# Patient Record
Sex: FEMALE | Race: OTHER | NOT HISPANIC OR LATINO | URBAN - METROPOLITAN AREA
[De-identification: names, ages, dates, MRNs, and addresses within clinical notes are randomized per-mention and may not be internally consistent; named-entity substitution may affect disease eponyms.]

---

## 2018-07-24 ENCOUNTER — EMERGENCY (EMERGENCY)
Facility: HOSPITAL | Age: 43
LOS: 1 days | Discharge: HOME | End: 2018-07-24
Attending: EMERGENCY MEDICINE | Admitting: EMERGENCY MEDICINE
Payer: COMMERCIAL

## 2018-07-24 VITALS
SYSTOLIC BLOOD PRESSURE: 143 MMHG | HEART RATE: 89 BPM | TEMPERATURE: 98 F | OXYGEN SATURATION: 98 % | RESPIRATION RATE: 18 BRPM | DIASTOLIC BLOOD PRESSURE: 85 MMHG
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
## 2018-07-24 DIAGNOSIS — S80.211A ABRASION, RIGHT KNEE, INITIAL ENCOUNTER: ICD-10-CM

## 2018-07-24 DIAGNOSIS — S93.402A SPRAIN OF UNSPECIFIED LIGAMENT OF LEFT ANKLE, INITIAL ENCOUNTER: ICD-10-CM
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
## 2018-07-24 DIAGNOSIS — M25.572 PAIN IN LEFT ANKLE AND JOINTS OF LEFT FOOT: ICD-10-CM
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
## 2018-07-24 PROCEDURE — 73610 X-RAY EXAM OF ANKLE: CPT | Mod: 26,LT

## 2018-07-24 PROCEDURE — 99283 EMERGENCY DEPT VISIT LOW MDM: CPT | Mod: 25

## 2018-07-24 PROCEDURE — 73564 X-RAY EXAM KNEE 4 OR MORE: CPT | Mod: 26,RT

## 2018-07-24 PROCEDURE — 73610 X-RAY EXAM OF ANKLE: CPT | Mod: 26,RT

## 2018-07-24 RX ORDER — IBUPROFEN 200 MG
600 TABLET ORAL ONCE
Qty: 0 | Refills: 0 | Status: COMPLETED | OUTPATIENT
Start: 2018-07-24 | End: 2018-07-24

## 2018-07-24 RX ADMIN — Medication 600 MILLIGRAM(S): at 14:45

## 2018-07-24 NOTE — ED PROVIDER NOTE - DIAGNOSTIC INTERPRETATION
Interpreted by ED physician  Left ankle x-ray, 3 views  No fracture, no dislocation (joint spaces grossly normal), no Foreign Body noted, soft tissue normal     Interpreted by ED physician  Right knee x-ray, 4 views  No fracture, no dislocations and normal soft tissues.

## 2018-07-24 NOTE — ED PROVIDER NOTE - OBJECTIVE STATEMENT
44 yo F with no PMHx presents s/p mechanical fall c/o left ankle pain. Pt states was walking when suffered trip and fall, at which time twisted left ankle, which is now in pain. Pt notes suffered abrasions to right knee. Denies head trauma, LOC, fever, chills, or other complaints.

## 2018-07-24 NOTE — ED PROVIDER NOTE - MUSCULOSKELETAL, MLM
All 3 lateral and deltoid ligaments TTP. No point tenderness, no deformity, no effusion, and full range of motion of right knee.

## 2021-09-30 NOTE — ED PROVIDER NOTE - EYES, MLM
Clear bilaterally.
Normal vision: sees adequately in most situations; can see medication labels, newsprint